# Patient Record
Sex: MALE | Race: WHITE | NOT HISPANIC OR LATINO | URBAN - METROPOLITAN AREA
[De-identification: names, ages, dates, MRNs, and addresses within clinical notes are randomized per-mention and may not be internally consistent; named-entity substitution may affect disease eponyms.]

---

## 2017-08-31 ENCOUNTER — IMPORTED ENCOUNTER (OUTPATIENT)
Dept: URBAN - METROPOLITAN AREA CLINIC 9 | Facility: CLINIC | Age: 62
End: 2017-08-31

## 2017-10-17 ENCOUNTER — IMPORTED ENCOUNTER (OUTPATIENT)
Dept: URBAN - METROPOLITAN AREA CLINIC 9 | Facility: CLINIC | Age: 62
End: 2017-10-17

## 2017-11-30 ENCOUNTER — IMPORTED ENCOUNTER (OUTPATIENT)
Dept: URBAN - METROPOLITAN AREA CLINIC 9 | Facility: CLINIC | Age: 62
End: 2017-11-30

## 2018-02-26 ENCOUNTER — IMPORTED ENCOUNTER (OUTPATIENT)
Dept: URBAN - METROPOLITAN AREA CLINIC 9 | Facility: CLINIC | Age: 63
End: 2018-02-26

## 2018-05-23 ENCOUNTER — IMPORTED ENCOUNTER (OUTPATIENT)
Dept: URBAN - METROPOLITAN AREA CLINIC 9 | Facility: CLINIC | Age: 63
End: 2018-05-23

## 2021-03-05 NOTE — PATIENT DISCUSSION
detailed refraction, trial framed, patient saw improvement, early symptoms, recheck in one year, utilize artificial tears PRN.

## 2021-09-29 ENCOUNTER — IMPORTED ENCOUNTER (OUTPATIENT)
Dept: URBAN - METROPOLITAN AREA CLINIC 9 | Facility: CLINIC | Age: 66
End: 2021-09-29

## 2021-09-29 PROBLEM — H25.12: Noted: 2021-09-29

## 2021-10-16 ASSESSMENT — VISUAL ACUITY
OS_SC: 20/30 -2 SN
OD_SC: 20/30 SN
OD_SC: 20/30 -2 SN
OS_SC: 20/30 SN
OS_CC: 20/25 SN
OS_SC: 20/25 -2 SN
OD_CC: 20/25 - SN
OD_SC: 20/40 -2 SN

## 2021-10-16 ASSESSMENT — TONOMETRY
OD_IOP_MMHG: 23
OS_IOP_MMHG: 21

## 2021-10-21 ENCOUNTER — CATARACT POST-OP 1-DAY (OUTPATIENT)
Dept: URBAN - NONMETROPOLITAN AREA CLINIC 6 | Facility: CLINIC | Age: 66
End: 2021-10-21

## 2021-10-21 DIAGNOSIS — Z96.1: ICD-10-CM

## 2021-10-21 PROCEDURE — 99024 POSTOP FOLLOW-UP VISIT: CPT

## 2021-10-21 ASSESSMENT — VISUAL ACUITY: OD_SC: 20/25-2

## 2021-10-21 ASSESSMENT — TONOMETRY: OD_IOP_MMHG: 16

## 2021-10-28 ENCOUNTER — POST-OP (OUTPATIENT)
Dept: URBAN - NONMETROPOLITAN AREA CLINIC 6 | Facility: CLINIC | Age: 66
End: 2021-10-28

## 2021-10-28 DIAGNOSIS — H25.12: ICD-10-CM

## 2021-10-28 DIAGNOSIS — Z96.1: ICD-10-CM

## 2021-10-28 PROCEDURE — 92136 OPHTHALMIC BIOMETRY: CPT

## 2021-10-28 PROCEDURE — 99024 POSTOP FOLLOW-UP VISIT: CPT

## 2021-10-28 RX ORDER — OFLOXACIN 3 MG/ML
1 SOLUTION OPHTHALMIC
Start: 2021-11-15

## 2021-10-28 RX ORDER — KETOROLAC TROMETHAMINE 5 MG/ML
1 SOLUTION OPHTHALMIC
Start: 2021-11-15

## 2021-10-28 ASSESSMENT — KERATOMETRY
OD_K2POWER_DIOPTERS: 46.50
OD_K1POWER_DIOPTERS: 44.25
OD_AXISANGLE_DEGREES: 72
OD_AXISANGLE2_DEGREES: 162

## 2021-10-28 ASSESSMENT — VISUAL ACUITY
OD_SC: 20/25-1
OD_SC: J2

## 2021-10-28 ASSESSMENT — TONOMETRY: OD_IOP_MMHG: 20

## 2021-11-18 ENCOUNTER — CATARACT POST-OP 1-DAY (OUTPATIENT)
Dept: URBAN - NONMETROPOLITAN AREA CLINIC 6 | Facility: CLINIC | Age: 66
End: 2021-11-18

## 2021-11-18 DIAGNOSIS — Z96.1: ICD-10-CM

## 2021-11-18 PROCEDURE — 99024 POSTOP FOLLOW-UP VISIT: CPT

## 2021-11-18 ASSESSMENT — VISUAL ACUITY: OS_SC: 20/60-1

## 2021-11-18 ASSESSMENT — TONOMETRY: OS_IOP_MMHG: 16

## 2021-11-23 ENCOUNTER — POST-OP (OUTPATIENT)
Dept: URBAN - NONMETROPOLITAN AREA CLINIC 6 | Facility: CLINIC | Age: 66
End: 2021-11-23

## 2021-11-23 DIAGNOSIS — Z96.1: ICD-10-CM

## 2021-11-23 PROCEDURE — 99024 POSTOP FOLLOW-UP VISIT: CPT

## 2021-11-23 ASSESSMENT — KERATOMETRY
OS_AXISANGLE2_DEGREES: 13
OS_K1POWER_DIOPTERS: 32.00
OS_AXISANGLE_DEGREES: 103
OS_K2POWER_DIOPTERS: 57.25

## 2021-11-23 ASSESSMENT — TONOMETRY: OS_IOP_MMHG: 18

## 2021-11-23 ASSESSMENT — VISUAL ACUITY
OS_SC: J2
OS_SC: 20/25-1

## 2021-12-21 ENCOUNTER — POST-OP (OUTPATIENT)
Dept: URBAN - NONMETROPOLITAN AREA CLINIC 6 | Facility: CLINIC | Age: 66
End: 2021-12-21

## 2021-12-21 DIAGNOSIS — Z96.1: ICD-10-CM

## 2021-12-21 PROCEDURE — 99024 POSTOP FOLLOW-UP VISIT: CPT

## 2021-12-21 ASSESSMENT — VISUAL ACUITY
OS_SC: 20/25-1
OS_SC: J1-2

## 2021-12-21 ASSESSMENT — TONOMETRY: OS_IOP_MMHG: 15

## 2022-03-21 ENCOUNTER — ESTABLISHED PATIENT (OUTPATIENT)
Dept: URBAN - NONMETROPOLITAN AREA CLINIC 6 | Facility: CLINIC | Age: 67
End: 2022-03-21

## 2022-03-21 DIAGNOSIS — H26.491: ICD-10-CM

## 2022-03-21 DIAGNOSIS — H26.492: ICD-10-CM

## 2022-03-21 DIAGNOSIS — E11.9: ICD-10-CM

## 2022-03-21 PROCEDURE — 92014 COMPRE OPH EXAM EST PT 1/>: CPT

## 2022-03-21 PROCEDURE — 66821 AFTER CATARACT LASER SURGERY: CPT

## 2022-03-21 ASSESSMENT — VISUAL ACUITY
OU_SC: 20/30
OS_SC: J2
OS_SC: 20/40
OD_SC: 20/40-1
OD_SC: J3

## 2022-03-21 ASSESSMENT — KERATOMETRY
OS_AXISANGLE2_DEGREES: 46
OD_K2POWER_DIOPTERS: 70.75
OD_K1POWER_DIOPTERS: 38.00
OS_K2POWER_DIOPTERS: 45.75
OS_K1POWER_DIOPTERS: 44.25
OS_AXISANGLE_DEGREES: 136
OD_AXISANGLE_DEGREES: 94
OD_AXISANGLE2_DEGREES: 4

## 2022-03-21 ASSESSMENT — TONOMETRY
OD_IOP_MMHG: 21
OS_IOP_MMHG: 18

## 2022-04-04 ENCOUNTER — CLINIC PROCEDURE ONLY (OUTPATIENT)
Dept: URBAN - NONMETROPOLITAN AREA CLINIC 6 | Facility: CLINIC | Age: 67
End: 2022-04-04

## 2022-04-04 DIAGNOSIS — H26.492: ICD-10-CM

## 2022-04-04 PROCEDURE — 66821 AFTER CATARACT LASER SURGERY: CPT

## 2022-04-04 ASSESSMENT — VISUAL ACUITY: OD_SC: 20/25-2

## 2022-04-04 ASSESSMENT — TONOMETRY
OS_IOP_MMHG: 18
OD_IOP_MMHG: 14

## 2022-06-09 ENCOUNTER — POST-OP (OUTPATIENT)
Dept: URBAN - NONMETROPOLITAN AREA CLINIC 6 | Facility: CLINIC | Age: 67
End: 2022-06-09

## 2022-06-09 DIAGNOSIS — Z98.890: ICD-10-CM

## 2022-06-09 PROCEDURE — 99024 POSTOP FOLLOW-UP VISIT: CPT

## 2022-06-09 ASSESSMENT — TONOMETRY
OD_IOP_MMHG: 13
OS_IOP_MMHG: 10

## 2022-06-09 ASSESSMENT — VISUAL ACUITY
OU_SC: 20/20-1
OD_SC: 20/40
OS_SC: 20/25
OU_SC: J1

## 2025-05-06 ENCOUNTER — COMPREHENSIVE EXAM (OUTPATIENT)
Age: 70
End: 2025-05-06

## 2025-05-06 DIAGNOSIS — H02.413: ICD-10-CM

## 2025-05-06 DIAGNOSIS — H04.123: ICD-10-CM

## 2025-05-06 DIAGNOSIS — H02.834: ICD-10-CM

## 2025-05-06 DIAGNOSIS — H02.831: ICD-10-CM

## 2025-05-06 DIAGNOSIS — E11.9: ICD-10-CM

## 2025-05-06 PROCEDURE — 92134 CPTRZ OPH DX IMG PST SGM RTA: CPT | Mod: NC

## 2025-05-06 PROCEDURE — 92014 COMPRE OPH EXAM EST PT 1/>: CPT

## 2025-05-06 PROCEDURE — 92015 DETERMINE REFRACTIVE STATE: CPT
